# Patient Record
Sex: FEMALE | Race: WHITE | NOT HISPANIC OR LATINO | Employment: OTHER | URBAN - METROPOLITAN AREA
[De-identification: names, ages, dates, MRNs, and addresses within clinical notes are randomized per-mention and may not be internally consistent; named-entity substitution may affect disease eponyms.]

---

## 2021-09-24 ENCOUNTER — OFFICE VISIT (OUTPATIENT)
Dept: CARDIOLOGY | Facility: CLINIC | Age: 66
End: 2021-09-24

## 2021-09-24 VITALS
DIASTOLIC BLOOD PRESSURE: 79 MMHG | HEART RATE: 68 BPM | BODY MASS INDEX: 27.11 KG/M2 | WEIGHT: 153 LBS | SYSTOLIC BLOOD PRESSURE: 134 MMHG | HEIGHT: 63 IN

## 2021-09-24 DIAGNOSIS — F17.200 SMOKING: ICD-10-CM

## 2021-09-24 DIAGNOSIS — I10 ESSENTIAL HYPERTENSION: ICD-10-CM

## 2021-09-24 DIAGNOSIS — E78.2 MIXED DYSLIPIDEMIA: ICD-10-CM

## 2021-09-24 DIAGNOSIS — R07.89 CHEST PAIN, ATYPICAL: ICD-10-CM

## 2021-09-24 DIAGNOSIS — I25.118 CORONARY ARTERY DISEASE INVOLVING NATIVE CORONARY ARTERY OF NATIVE HEART WITH OTHER FORM OF ANGINA PECTORIS (HCC): Primary | ICD-10-CM

## 2021-09-24 PROCEDURE — 99203 OFFICE O/P NEW LOW 30 MIN: CPT | Performed by: INTERNAL MEDICINE

## 2021-09-24 PROCEDURE — 93000 ELECTROCARDIOGRAM COMPLETE: CPT | Performed by: INTERNAL MEDICINE

## 2021-09-24 RX ORDER — DICLOFENAC SODIUM 75 MG/1
75 TABLET, DELAYED RELEASE ORAL 2 TIMES DAILY
COMMUNITY
Start: 2021-09-10

## 2021-09-24 RX ORDER — OMEPRAZOLE 20 MG/1
CAPSULE, DELAYED RELEASE ORAL DAILY
COMMUNITY
Start: 2021-09-22

## 2021-09-24 RX ORDER — NITROGLYCERIN 0.4 MG/1
0.4 TABLET SUBLINGUAL AS NEEDED
COMMUNITY

## 2021-09-24 RX ORDER — UREA 10 %
800 LOTION (ML) TOPICAL
COMMUNITY

## 2021-09-24 RX ORDER — ISOSORBIDE MONONITRATE 30 MG/1
30 TABLET, EXTENDED RELEASE ORAL DAILY
COMMUNITY
Start: 2021-08-07

## 2021-09-24 RX ORDER — ATORVASTATIN CALCIUM 40 MG/1
40 TABLET, FILM COATED ORAL DAILY
COMMUNITY
Start: 2021-08-20

## 2021-09-24 RX ORDER — LISINOPRIL AND HYDROCHLOROTHIAZIDE 20; 12.5 MG/1; MG/1
1 TABLET ORAL DAILY
COMMUNITY
Start: 2021-09-07

## 2021-09-24 RX ORDER — POTASSIUM CHLORIDE 600 MG/1
8 TABLET, FILM COATED, EXTENDED RELEASE ORAL DAILY
COMMUNITY

## 2021-09-24 NOTE — PROGRESS NOTES
Chief Complaint  Chest Pain and Hypertension      Chest Pain     Hypertension  Associated symptoms include chest pain.     Aniceto Woods presents to [facility] CARDIOLOGY    This is a pleasant 66-year-old lady with past medical history significant for hypertension, dyslipidemia, nonobstructive CAD as documented by previous left heart catheterization and 2014 by Dr. Becker with [facility], was referred to clinic for evaluation of chest discomfort. She complains of recurrent chest discomfort. It is located in the epigastric and retrosternal area. It does not radiate. It is associated with shortness of breath and tingling in her fingers. She denies aggravating or relieving factors. It varies in duration but usually resolves within few minutes. She has occasional palpitations. She has no dyspnea, orthopnea, presyncope or syncope.        Past Medical History:   Diagnosis Date   • COPD (chronic obstructive pulmonary disease) (CMS/Formerly [facility])    • Diabetes mellitus (CMS/Formerly [facility])     borderline   • Hyperlipidemia    • Hypertension    • Sleep apnea          Current Outpatient Medications:   •  atorvastatin (LIPITOR) 40 MG tablet, Take 40 mg by mouth Daily., Disp: , Rfl:   •  diclofenac (VOLTAREN) 75 MG EC tablet, Take 75 mg by mouth 2 (Two) Times a Day., Disp: , Rfl:   •  folic acid (FOLVITE) 800 MCG tablet, Take 800 mcg by mouth., Disp: , Rfl:   •  isosorbide mononitrate (IMDUR) 30 MG 24 hr tablet, Take 30 mg by mouth Daily., Disp: , Rfl:   •  lisinopril-hydrochlorothiazide (PRINZIDE,ZESTORETIC) 20-12.5 MG per tablet, Take 1 tablet by mouth Daily., Disp: , Rfl:   •  metoprolol tartrate (LOPRESSOR) 25 MG tablet, Take 25 mg by mouth Daily., Disp: , Rfl:   •  nitroglycerin (NITROSTAT) 0.4 MG SL tablet, Place 0.4 mg under the tongue As Needed., Disp: , Rfl:   •  omeprazole (priLOSEC) 20 MG capsule, Daily., Disp: , Rfl:   •  potassium chloride (KLOR-CON) 8 MEQ CR tablet, Take 8 mEq by mouth Daily., Disp: , Rfl:     There  "are no discontinued medications.  Allergies   Allergen Reactions   • Sucralfate Swelling   • Thiopental Seizure   • Coconut Hives   • Morphine Anxiety and Nausea And Vomiting   • Adhesive Tape Rash   • Aspirin Anxiety and Nausea And Vomiting     Pt gets lightheaded     • Phenobarbital Anxiety and Nausea And Vomiting     Shaky        Social History     Tobacco Use   • Smoking status: Current Some Day Smoker     Packs/day: 0.50     Types: Cigarettes     Start date: 1978   • Smokeless tobacco: Never Used   Vaping Use   • Vaping Use: Never used   Substance Use Topics   • Alcohol use: Never   • Drug use: Never       Family History   Problem Relation Age of Onset   • Emphysema Mother    • Parkinsonism Mother    • Angina Father    • Emphysema Father    • Colon cancer Father    • Lung cancer Sister    • Bone cancer Grandson         Objective     /79   Pulse 68   Ht 160 cm (63\")   Wt 69.4 kg (153 lb)   BMI 27.10 kg/m²       Physical Exam  Constitutional:       General: Awake. Not in acute distress.     Appearance: Normal appearance.   Neck:      Vascular: No carotid bruit, hepatojugular reflux or JVD.   Cardiovascular:      Rate and Rhythm: Normal rate and regular rhythm.      Chest Wall: PMI is not displaced.      Heart sounds: Normal heart sounds, S1 normal and S2 normal. No murmur heard.   No friction rub. No gallop. No S3 or S4 sounds.    Pulmonary:      Effort: Pulmonary effort is normal.      Breath sounds: Normal breath sounds. No wheezing, rhonchi or rales.   Ext.      Right lower leg: No edema.      Left lower leg: No edema.   Skin:     General: Skin is warm and dry.      Coloration: Skin is not cyanotic.      Findings: No petechiae or rash.   Neurological:      Mental Status: Alert and oriented x 3  Psychiatric:         Behavior: Behavior is cooperative.       Result Review :     No results found for: PROBNP       No results found for: TSH   No results found for: FREET4   No results found for: " DDIMERQUANT  No results found for: MG   No results found for: DIGOXIN   No results found for: TROPONINT   No results found for: POCTROP(       Lipid Panel    Lipid Panel 8/10/21   Total Cholesterol 164   Triglycerides 119   HDL Cholesterol 57   LDL Cholesterol  83      Comments are available for some flowsheets but are not being displayed.                ECG 12 Lead    Date/Time: 9/24/2021 2:54 PM  Performed by: Thor Roy MD  Authorized by: Thor Roy MD   Comparison: not compared with previous ECG   Previous ECG: no previous ECG available  Comments: ECG: Normal sinus rhythm, inferior Q waves, possible old inferior infarct                  Diagnoses and all orders for this visit:    1. Coronary artery disease involving native coronary artery of native heart with other form of angina pectoris (HCC) (Primary)  -     Stress Test With Myocardial Perfusion One Day; Future    2. Essential hypertension    3. Mixed dyslipidemia    4. Smoking    5. Chest pain, atypical      Assessment and plan:    -Atypical chest discomfort: She has been having recurrent episodes of chest discomfort as described in HPI. It has been stable in pattern for quite some time now. She had previous left heart catheterization in 2014 which showed nonobstructive disease of the LAD. Treadmill stress test in 2020 was equivocal. She also had echocardiogram done at that time which showed normal LV systolic function without significant valvular pathology. She will be scheduled for exercise nuclear stress test to assess for myocardial ischemia. Continue metoprolol, nitrates and statin. She is allergic to aspirin. Further recommendations to follow    -Hypertension: She has fluctuating blood pressure readings. She was advised to keep a log of her home blood pressure readings and to bring it with her next follow-up visit for review. Continue current medical therapy for now    -Dyslipidemia: Continue statin therapy    -Smoking: Risks of ongoing  smoking including cardiovascular risk, stroke and lung disease were explained to the patient. The importance of smoking cessation was explained. She expressed understanding    Patient will return to clinic in 2 3 months or sooner if needed    Follow Up       Return in about 3 months (around 12/24/2021).    Patient was given instructions and counseling regarding her condition or for health maintenance advice. Please see specific information pulled into the AVS if appropriate.

## 2021-10-04 ENCOUNTER — HOSPITAL ENCOUNTER (OUTPATIENT)
Dept: NUCLEAR MEDICINE | Facility: HOSPITAL | Age: 66
Discharge: HOME | End: 2021-10-04

## 2021-10-04 DIAGNOSIS — I25.118 CORONARY ARTERY DISEASE INVOLVING NATIVE CORONARY ARTERY OF NATIVE HEART WITH OTHER FORM OF ANGINA PECTORIS (HCC): ICD-10-CM

## 2021-10-04 LAB
BH CV IMMEDIATE POST TECH DATA BLOOD PRESSURE: NORMAL MMHG
BH CV IMMEDIATE POST TECH DATA HEART RATE: 98 BPM
BH CV IMMEDIATE POST TECH DATA OXYGEN SATS: 98 %
BH CV REST NUCLEAR ISOTOPE DOSE: 10.3 MCI
BH CV SIX MINUTE RECOVERY TECH DATA BLOOD PRESSURE: NORMAL
BH CV SIX MINUTE RECOVERY TECH DATA HEART RATE: 96 BPM
BH CV SIX MINUTE RECOVERY TECH DATA OXYGEN SATURATION: 98 %
BH CV SIX MINUTE RECOVERY TECH DATA SYMPTOMS: NORMAL
BH CV STRESS COMMENTS STAGE 1: NORMAL
BH CV STRESS DOSE REGADENOSON STAGE 1: 0.4
BH CV STRESS DURATION MIN STAGE 1: 0
BH CV STRESS DURATION SEC STAGE 1: 10
BH CV STRESS NUCLEAR ISOTOPE DOSE: 37.7 MCI
BH CV STRESS PROTOCOL 1: NORMAL
BH CV STRESS RECOVERY BP: NORMAL MMHG
BH CV STRESS RECOVERY HR: 96 BPM
BH CV STRESS RECOVERY O2: 98 %
BH CV STRESS STAGE 1: 1
BH CV THREE MINUTE POST TECH DATA BLOOD PRESSURE: NORMAL MMHG
BH CV THREE MINUTE POST TECH DATA HEART RATE: 94 BPM
BH CV THREE MINUTE POST TECH DATA OXYGEN SATURATION: 99 %
LV EF NUC BP: 87 %
MAXIMAL PREDICTED HEART RATE: 153 BPM
PERCENT MAX PREDICTED HR: 67.32 %
STRESS BASELINE BP: NORMAL MMHG
STRESS BASELINE HR: 71 BPM
STRESS O2 SAT REST: 98 %
STRESS PERCENT HR: 79 %
STRESS POST O2 SAT PEAK: 99 %
STRESS POST PEAK BP: NORMAL MMHG
STRESS POST PEAK HR: 103 BPM
STRESS TARGET HR: 130 BPM

## 2021-10-04 PROCEDURE — 93018 CV STRESS TEST I&R ONLY: CPT | Performed by: INTERNAL MEDICINE

## 2021-10-04 PROCEDURE — 93017 CV STRESS TEST TRACING ONLY: CPT

## 2021-10-04 PROCEDURE — 0 TECHNETIUM TETROFOSMIN KIT: Performed by: INTERNAL MEDICINE

## 2021-10-04 PROCEDURE — A9502 TC99M TETROFOSMIN: HCPCS | Performed by: INTERNAL MEDICINE

## 2021-10-04 PROCEDURE — 93016 CV STRESS TEST SUPVJ ONLY: CPT | Performed by: NURSE PRACTITIONER

## 2021-10-04 PROCEDURE — 25010000002 REGADENOSON 0.4 MG/5ML SOLUTION

## 2021-10-04 PROCEDURE — 78452 HT MUSCLE IMAGE SPECT MULT: CPT | Performed by: INTERNAL MEDICINE

## 2021-10-04 PROCEDURE — 78452 HT MUSCLE IMAGE SPECT MULT: CPT

## 2021-10-04 RX ADMIN — REGADENOSON: 0.08 INJECTION, SOLUTION INTRAVENOUS at 12:52

## 2021-10-04 RX ADMIN — TETROFOSMIN 1 DOSE: 1.38 INJECTION, POWDER, LYOPHILIZED, FOR SOLUTION INTRAVENOUS at 11:21

## 2021-10-04 RX ADMIN — TETROFOSMIN 1 DOSE: 1.38 INJECTION, POWDER, LYOPHILIZED, FOR SOLUTION INTRAVENOUS at 13:02
